# Patient Record
Sex: MALE | Race: WHITE | NOT HISPANIC OR LATINO | Employment: FULL TIME | URBAN - METROPOLITAN AREA
[De-identification: names, ages, dates, MRNs, and addresses within clinical notes are randomized per-mention and may not be internally consistent; named-entity substitution may affect disease eponyms.]

---

## 2021-10-18 ENCOUNTER — HOSPITAL ENCOUNTER (EMERGENCY)
Facility: CLINIC | Age: 31
Discharge: HOME | End: 2021-10-18
Attending: EMERGENCY MEDICINE | Admitting: EMERGENCY MEDICINE
Payer: COMMERCIAL

## 2021-10-18 ENCOUNTER — TELEPHONE (OUTPATIENT)
Dept: PEDIATRICS | Facility: CLINIC | Age: 31
End: 2021-10-18

## 2021-10-18 VITALS
RESPIRATION RATE: 18 BRPM | OXYGEN SATURATION: 99 % | HEART RATE: 73 BPM | TEMPERATURE: 97.6 F | SYSTOLIC BLOOD PRESSURE: 123 MMHG | DIASTOLIC BLOOD PRESSURE: 86 MMHG

## 2021-10-18 DIAGNOSIS — R07.89 ATYPICAL CHEST PAIN: ICD-10-CM

## 2021-10-18 DIAGNOSIS — R07.9 CHEST PAIN: Primary | ICD-10-CM

## 2021-10-18 DIAGNOSIS — F41.9 ANXIETY: ICD-10-CM

## 2021-10-18 DIAGNOSIS — R00.2 PALPITATIONS: ICD-10-CM

## 2021-10-18 LAB
ANION GAP SERPL CALCULATED.3IONS-SCNC: 7 MMOL/L (ref 3–14)
ATRIAL RATE - MUSE: 75 BPM
BASOPHILS # BLD AUTO: 0.1 10E3/UL (ref 0–0.2)
BASOPHILS NFR BLD AUTO: 1 %
BUN SERPL-MCNC: 13 MG/DL (ref 7–30)
CALCIUM SERPL-MCNC: 9 MG/DL (ref 8.5–10.1)
CHLORIDE BLD-SCNC: 104 MMOL/L (ref 94–109)
CO2 SERPL-SCNC: 28 MMOL/L (ref 20–32)
CREAT SERPL-MCNC: 1.02 MG/DL (ref 0.66–1.25)
DIASTOLIC BLOOD PRESSURE - MUSE: NORMAL MMHG
EOSINOPHIL # BLD AUTO: 0.2 10E3/UL (ref 0–0.7)
EOSINOPHIL NFR BLD AUTO: 2 %
ERYTHROCYTE [DISTWIDTH] IN BLOOD BY AUTOMATED COUNT: 11.6 % (ref 10–15)
GFR SERPL CREATININE-BSD FRML MDRD: >90 ML/MIN/1.73M2
GLUCOSE BLD-MCNC: 85 MG/DL (ref 70–99)
HCT VFR BLD AUTO: 48.3 % (ref 40–53)
HGB BLD-MCNC: 16.9 G/DL (ref 13.3–17.7)
HOLD SPECIMEN: NORMAL
IMM GRANULOCYTES # BLD: 0 10E3/UL
IMM GRANULOCYTES NFR BLD: 0 %
INTERPRETATION ECG - MUSE: NORMAL
LYMPHOCYTES # BLD AUTO: 2.2 10E3/UL (ref 0.8–5.3)
LYMPHOCYTES NFR BLD AUTO: 23 %
MCH RBC QN AUTO: 30.5 PG (ref 26.5–33)
MCHC RBC AUTO-ENTMCNC: 35 G/DL (ref 31.5–36.5)
MCV RBC AUTO: 87 FL (ref 78–100)
MONOCYTES # BLD AUTO: 1 10E3/UL (ref 0–1.3)
MONOCYTES NFR BLD AUTO: 11 %
NEUTROPHILS # BLD AUTO: 5.8 10E3/UL (ref 1.6–8.3)
NEUTROPHILS NFR BLD AUTO: 63 %
NRBC # BLD AUTO: 0 10E3/UL
NRBC BLD AUTO-RTO: 0 /100
P AXIS - MUSE: 39 DEGREES
PLATELET # BLD AUTO: 256 10E3/UL (ref 150–450)
POTASSIUM BLD-SCNC: 3.5 MMOL/L (ref 3.4–5.3)
PR INTERVAL - MUSE: 188 MS
QRS DURATION - MUSE: 100 MS
QT - MUSE: 414 MS
QTC - MUSE: 462 MS
R AXIS - MUSE: 31 DEGREES
RBC # BLD AUTO: 5.54 10E6/UL (ref 4.4–5.9)
SODIUM SERPL-SCNC: 139 MMOL/L (ref 133–144)
SYSTOLIC BLOOD PRESSURE - MUSE: NORMAL MMHG
T AXIS - MUSE: 8 DEGREES
TROPONIN I SERPL-MCNC: <0.015 UG/L (ref 0–0.04)
VENTRICULAR RATE- MUSE: 75 BPM
WBC # BLD AUTO: 9.2 10E3/UL (ref 4–11)

## 2021-10-18 PROCEDURE — 99284 EMERGENCY DEPT VISIT MOD MDM: CPT

## 2021-10-18 PROCEDURE — 93000 ELECTROCARDIOGRAM COMPLETE: CPT | Performed by: NURSE PRACTITIONER

## 2021-10-18 PROCEDURE — 36415 COLL VENOUS BLD VENIPUNCTURE: CPT | Performed by: EMERGENCY MEDICINE

## 2021-10-18 PROCEDURE — 84484 ASSAY OF TROPONIN QUANT: CPT | Performed by: EMERGENCY MEDICINE

## 2021-10-18 PROCEDURE — 85025 COMPLETE CBC W/AUTO DIFF WBC: CPT | Performed by: EMERGENCY MEDICINE

## 2021-10-18 PROCEDURE — 250N000013 HC RX MED GY IP 250 OP 250 PS 637: Performed by: EMERGENCY MEDICINE

## 2021-10-18 PROCEDURE — 80048 BASIC METABOLIC PNL TOTAL CA: CPT | Performed by: EMERGENCY MEDICINE

## 2021-10-18 PROCEDURE — 93005 ELECTROCARDIOGRAM TRACING: CPT

## 2021-10-18 RX ORDER — CLONAZEPAM 0.5 MG/1
1 TABLET ORAL ONCE
Status: COMPLETED | OUTPATIENT
Start: 2021-10-18 | End: 2021-10-18

## 2021-10-18 RX ORDER — CLONAZEPAM 1 MG/1
1 TABLET ORAL 2 TIMES DAILY PRN
Qty: 28 TABLET | Refills: 0 | Status: SHIPPED | OUTPATIENT
Start: 2021-10-18 | End: 2021-11-07

## 2021-10-18 RX ADMIN — CLONAZEPAM 1 MG: 0.5 TABLET ORAL at 09:01

## 2021-10-18 ASSESSMENT — ENCOUNTER SYMPTOMS
FEVER: 0
NERVOUS/ANXIOUS: 1
PALPITATIONS: 1
SORE THROAT: 0
COUGH: 0
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
## 2021-10-18 NOTE — TELEPHONE ENCOUNTER
Patient walked into the Urgent Care with complaint of Chest pain, heart flutter sensation, and shortness of breath for the past 2 days.     Chest pain, intermittent for the past 2 days.   Left sided chest pain.   Mild to moderate intensity.  Feels flutters in the chest.    Anxiety, chest pain and flutter sensation has been getting worse over the past 24 hours.   Shortness of breath for the past day and a half.     Anxiety has been worsening.  Issues with getting anxiety meds refilled due to appt needed and affordability.   Has not been able to sleep well due to symptoms.     Patient is currently experiencing Chest pain and Shortness of breath.     EKG taken.    Vitals:  98% O2.  78 pulse.   20 rr.  137/90 bp.  97.1F.    Father has Afib and heart failure.     Huddled with Eloisa ROCK.  Plan:  Patient needs to be evaluated in the Emergency Department.     Relayed above recommendation from Eloisa ROCK to the patient.     Patient verbalized agreement with plan, and will have a family member take him to the Emergency Department immediately.    Abdi Maguire RN on 10/18/2021 at 8:18 AM

## 2021-11-07 ENCOUNTER — OFFICE VISIT (OUTPATIENT)
Dept: FAMILY MEDICINE | Facility: CLINIC | Age: 31
End: 2021-11-07
Payer: COMMERCIAL

## 2021-11-07 VITALS
BODY MASS INDEX: 27.98 KG/M2 | HEIGHT: 75 IN | RESPIRATION RATE: 14 BRPM | DIASTOLIC BLOOD PRESSURE: 82 MMHG | HEART RATE: 84 BPM | WEIGHT: 225 LBS | OXYGEN SATURATION: 96 % | SYSTOLIC BLOOD PRESSURE: 130 MMHG

## 2021-11-07 DIAGNOSIS — R25.3 MUSCLE TWITCHING: Primary | ICD-10-CM

## 2021-11-07 PROCEDURE — 99203 OFFICE O/P NEW LOW 30 MIN: CPT

## 2021-11-07 RX ORDER — BUSPIRONE HYDROCHLORIDE 15 MG/1
15 TABLET ORAL 2 TIMES DAILY
COMMUNITY
Start: 2021-06-08

## 2021-11-07 RX ORDER — CLONAZEPAM 1 MG/1
1 TABLET ORAL
COMMUNITY
Start: 2021-11-05

## 2021-11-07 ASSESSMENT — MIFFLIN-ST. JEOR: SCORE: 2071.22

## 2021-11-07 NOTE — PATIENT INSTRUCTIONS
I believe this is just muscle twitching from the energy drinks you have been consuming.  Stop the energy drinks. I would rather see you drink 1 cup of coffee daily if you need some caffeine.   Post workout drink should be a glass of chocolate milk with the extra protein such as [employer] milk.   Do some stretches to the pectoral muscles to relax the muscle and you can also try heat to the area   See your primary care provider in 1-2 weeks if not helping.

## 2021-11-07 NOTE — PROGRESS NOTES
Assessment & Plan     Muscle twitching  He has been working daily for the past 50 days.  Using energy drinks on a daily basis to help him stay awake and function better.  He also uses an energy drink post workout.  Recommend he stop all energy drinks, 1 cup of coffee would be better to drink than all the additives that are and energy drinks.  Also recommended stopping the post workout energy drink and drinking a full glass of chocolate milk that has extra protein.  ER note and labs were reviewed.  I do not feel this is a cardiac event today.   He was not too concerned about his lightheadedness feels this is related to his fatigue.  I would agree.      20 minutes spent on the date of the encounter doing chart review, patient visit and documentation        Tobacco Cessation:   reports that he has been smoking. He has never used smokeless tobacco.    Recommend he stop vaping      See Patient Instructions    Return in about 1 week (around 11/14/2021), or if symptoms worsen or fail to improve.    [facility]    Daryn West is a 31 year old who presents for the following health issues     HPI     31-year-old male presents to clinic today for visible muscle twitching in the upper left chest.  Was seen in the emergency room October 18, 2021 for atypical chest pain and anxiety.  Chart reviewed along with labs.  EKG normal, troponin negative, basic metabolic panel and CBC all within normal limits.  He noticed twitching in the left upper chest muscle this morning when he got up.  Twitching comes and goes.  Is concerned it could be his heart.  Has a family history of heart disease.  His dad has a pacemaker and is going through some health issues.  He uses clonazepam for anxiety.  States he has been working for the past 50 days and is on his last couple days of his long stretch.  Has been using energy drinks on a daily basis to help  "him get going.  Denies using street drugs, occasional alcohol use.  Does vape but does not use cigarettes.  Feels a little lightheaded at times, however he feels this could be related to his long stretch of work and being overtired.    Review of Systems   CONSTITUTIONAL:POSITIVE  for  fatigue  RESP:NEGATIVE for significant cough or SOB  CV: NEGATIVE for chest pain, palpitations or peripheral edema  GI: NEGATIVE for nausea, abdominal pain, heartburn, or change in bowel habits  MUSCULOSKELETAL: positive for muscle twitching upper chest  PSYCHIATRIC: POSITIVE foranxiety      Objective    /82   Pulse 84   Resp 14   Ht 1.905 m (6' 3\")   Wt 102.1 kg (225 lb)   SpO2 96%   BMI 28.12 kg/m    Body mass index is 28.12 kg/m .  Physical Exam   GENERAL: healthy, alert and no distress  NECK: no adenopathy, no asymmetry, masses, or scars and thyroid normal to palpation  RESP: lungs clear to auscultation - no rales, rhonchi or wheezes  CV: regular rate and rhythm, normal S1 S2, no S3 or S4, no murmur, click or rub, no peripheral edema and peripheral pulses strong  MS: no gross musculoskeletal defects noted, no edema            "

## 2023-01-11 ENCOUNTER — ANCILLARY PROCEDURE (OUTPATIENT)
Dept: GENERAL RADIOLOGY | Facility: CLINIC | Age: 33
End: 2023-01-11
Attending: NURSE PRACTITIONER
Payer: COMMERCIAL

## 2023-01-11 ENCOUNTER — OFFICE VISIT (OUTPATIENT)
Dept: FAMILY MEDICINE | Facility: CLINIC | Age: 33
End: 2023-01-11
Payer: COMMERCIAL

## 2023-01-11 VITALS
RESPIRATION RATE: 18 BRPM | OXYGEN SATURATION: 98 % | SYSTOLIC BLOOD PRESSURE: 119 MMHG | DIASTOLIC BLOOD PRESSURE: 79 MMHG | TEMPERATURE: 97.8 F | BODY MASS INDEX: 28.87 KG/M2 | WEIGHT: 231 LBS | HEART RATE: 84 BPM

## 2023-01-11 DIAGNOSIS — M25.562 CHRONIC PAIN OF LEFT KNEE: ICD-10-CM

## 2023-01-11 DIAGNOSIS — K29.90 GASTRITIS AND DUODENITIS: ICD-10-CM

## 2023-01-11 DIAGNOSIS — G89.29 CHRONIC PAIN OF LEFT KNEE: ICD-10-CM

## 2023-01-11 DIAGNOSIS — R07.89 CHEST WALL PAIN: Primary | ICD-10-CM

## 2023-01-11 DIAGNOSIS — R07.89 CHEST WALL PAIN: ICD-10-CM

## 2023-01-11 PROBLEM — F17.201 TOBACCO USE DISORDER, MILD, IN EARLY REMISSION: Status: ACTIVE | Noted: 2017-08-31

## 2023-01-11 PROBLEM — K20.90 ESOPHAGITIS: Status: ACTIVE | Noted: 2020-01-23

## 2023-01-11 PROCEDURE — 71046 X-RAY EXAM CHEST 2 VIEWS: CPT | Mod: TC | Performed by: RADIOLOGY

## 2023-01-11 PROCEDURE — 93000 ELECTROCARDIOGRAM COMPLETE: CPT

## 2023-01-11 PROCEDURE — 73562 X-RAY EXAM OF KNEE 3: CPT | Mod: TC | Performed by: RADIOLOGY

## 2023-01-11 PROCEDURE — 99214 OFFICE O/P EST MOD 30 MIN: CPT

## 2023-01-11 RX ORDER — FAMOTIDINE 20 MG/1
20 TABLET, FILM COATED ORAL 2 TIMES DAILY PRN
Qty: 60 TABLET | Refills: 0 | Status: SHIPPED | OUTPATIENT
Start: 2023-01-11 | End: 2023-02-10

## 2023-01-11 ASSESSMENT — ENCOUNTER SYMPTOMS
JOINT SWELLING: 0
NERVOUS/ANXIOUS: 1
NEUROLOGICAL NEGATIVE: 1
GASTROINTESTINAL NEGATIVE: 1
ARTHRALGIAS: 1
CONSTITUTIONAL NEGATIVE: 1
PALPITATIONS: 0
RESPIRATORY NEGATIVE: 1

## 2023-01-11 NOTE — PATIENT INSTRUCTIONS
Stop vaping.  I sent in an Rx for famotidine to take twice a day as needed  Use a knee sleeve for support of the left knee. If it continues to be an issues, please see you provider for further evaluation.

## 2023-01-11 NOTE — PROGRESS NOTES
Assessment & Plan     Chest wall pain  No acute findings on CXR or EKG. Likely is a combination of costochondritis as well as his gastritis acting up.  Recommend he stop vaping.  He quit smoking cigarettes over 5 years ago.    We will place him on famotidine twice a day as needed.  Can use Tylenol or ice to the chest.  Would rather avoid NSAIDs since he has a history of gastritis and esophagitis.  Recommend follow-up with his primary care provider when he heads back to [place]    - EKG 12-lead complete w/read - Clinics  - XR Chest 2 Views; Future    Chronic pain of left knee  He has a knee sleeve at home that he will use for support.  Recommend follow-up with primary care provider if symptoms persist  - XR Knee Left 3 Views; Future    Gastritis and duodenitis  History of esophagitis and gastritis not currently on a PPI or H2 blocker.  We will start him on famotidine twice daily as needed.  Follow-up with primary care if symptoms persist    - famotidine (PEPCID) 20 MG tablet; Take 1 tablet (20 mg) by mouth 2 times daily as needed (reflux)      30 minutes spent on the date of the encounter doing chart review, interpretation of tests, patient visit and documentation        Nicotine/Tobacco Cessation:  He reports that he has been smoking vaping device. He has never used smokeless tobacco.  Nicotine/Tobacco Cessation Plan:   Stop vaping      See Patient Instructions    Return for Follow up with your provider in 1-2 weeks for recheck.    [facility]    Daryn West is a 32 year old presenting for the following health issues:  Urgent Care (Been having discomfort in his chest with no numbness in the arm, family hx of heart disease, plus he wants to be seen for left knee too, he injured years ago, and this year he was playing softball and re injured )      HPI     Presents with several months of left sided chest wall pain that  is sharp in nature that comes and goes.  Is not related to any movement, rest or eating.  Denies any cough, fever.  Does note sometimes he feels he cannot take a full breath.  Does not have any history of asthma or respiratory issues.  Does have a family history of heart disease.  His father had a stent placed 6 months ago.  He does have a history of anxiety, has been seen several times for chest wall pain and anxiety in the ER and also at this clinic by this writer.  He has a history of smoking cigarettes and now he vapes quite frequently.  Has hx of heroin abuse, anxiety and depression. Is on xanax PRN but states he only uses it once a month.   Hx gastritis and esophagitis not currently on an H2B or PPI. IS wondering if this is causing his discomfort.   States he feels this chest discomfort is different than when he had a panic attack and was having chest pain.    Also would like his left knee evaluated.  Injured it 5 years ago playing softball has had pain in the left knee since.  Injury was never evaluated when it happened.  Has been able to play softball since, but notices that it will ache and swell up after activity sometimes.  Has not used anything for his discomfort.    Review of Systems   Constitutional: Negative.    Respiratory: Negative.    Cardiovascular: Positive for chest pain. Negative for palpitations.   Gastrointestinal: Negative.    Musculoskeletal: Positive for arthralgias. Negative for joint swelling.   Skin: Negative.    Neurological: Negative.    Psychiatric/Behavioral: The patient is nervous/anxious.         Objective    /79   Pulse 84   Temp 97.8  F (36.6  C)   Resp 18   Wt 104.8 kg (231 lb)   SpO2 98%   BMI 28.87 kg/m    Body mass index is 28.87 kg/m .  Physical Exam  Vitals reviewed.   Constitutional:       General: He is not in acute distress.     Appearance: Normal appearance.   Cardiovascular:      Rate and Rhythm: Normal rate and regular rhythm.      Heart sounds: Normal  heart sounds. No murmur heard.  Pulmonary:      Effort: Pulmonary effort is normal. No respiratory distress.      Breath sounds: Normal breath sounds.   Musculoskeletal:         General: No swelling, tenderness, deformity or signs of injury. Normal range of motion.      Cervical back: Normal range of motion and neck supple.      Right lower leg: No edema.      Left lower leg: No edema.   Skin:     General: Skin is warm and dry.   Neurological:      General: No focal deficit present.      Mental Status: He is alert and oriented to person, place, and time.   Psychiatric:         Mood and Affect: Mood normal.            CXR - Reviewed and interpreted by me Normal- no infiltrates, effusions, pneumothoraces, cardiomegaly or masses  Xray - Reviewed and interpreted by me.  No acute findings, good joint spacing  EKG - Reviewed and interpreted by me appears normal, NSR, normal axis, normal intervals, no acute ST/T changes c/w ischemia, no LVH by voltage criteria, unchanged from previous tracings

## 2023-03-26 ENCOUNTER — HOSPITAL ENCOUNTER (EMERGENCY)
Facility: CLINIC | Age: 33
Discharge: HOME | End: 2023-03-26
Attending: STUDENT IN AN ORGANIZED HEALTH CARE EDUCATION/TRAINING PROGRAM | Admitting: STUDENT IN AN ORGANIZED HEALTH CARE EDUCATION/TRAINING PROGRAM
Payer: COMMERCIAL

## 2023-03-26 VITALS
WEIGHT: 225 LBS | RESPIRATION RATE: 16 BRPM | BODY MASS INDEX: 27.98 KG/M2 | HEART RATE: 101 BPM | DIASTOLIC BLOOD PRESSURE: 82 MMHG | SYSTOLIC BLOOD PRESSURE: 147 MMHG | TEMPERATURE: 98.4 F | HEIGHT: 75 IN | OXYGEN SATURATION: 98 %

## 2023-03-26 DIAGNOSIS — F43.21 GRIEF REACTION: ICD-10-CM

## 2023-03-26 LAB
AMPHETAMINES UR QL SCN: NORMAL
ANION GAP SERPL CALCULATED.3IONS-SCNC: 13 MMOL/L (ref 7–15)
APAP SERPL-MCNC: <5 UG/ML (ref 10–30)
BARBITURATES UR QL SCN: NORMAL
BASOPHILS # BLD AUTO: 0.1 10E3/UL (ref 0–0.2)
BASOPHILS NFR BLD AUTO: 1 %
BENZODIAZ UR QL SCN: NORMAL
BUN SERPL-MCNC: 10.3 MG/DL (ref 6–20)
BZE UR QL SCN: NORMAL
CALCIUM SERPL-MCNC: 9.7 MG/DL (ref 8.6–10)
CANNABINOIDS UR QL SCN: NORMAL
CHLORIDE SERPL-SCNC: 100 MMOL/L (ref 98–107)
CREAT SERPL-MCNC: 0.89 MG/DL (ref 0.67–1.17)
DEPRECATED HCO3 PLAS-SCNC: 24 MMOL/L (ref 22–29)
EOSINOPHIL # BLD AUTO: 0 10E3/UL (ref 0–0.7)
EOSINOPHIL NFR BLD AUTO: 0 %
ERYTHROCYTE [DISTWIDTH] IN BLOOD BY AUTOMATED COUNT: 11.9 % (ref 10–15)
GFR SERPL CREATININE-BSD FRML MDRD: >90 ML/MIN/1.73M2
GLUCOSE SERPL-MCNC: 94 MG/DL (ref 70–99)
HCT VFR BLD AUTO: 47.3 % (ref 40–53)
HGB BLD-MCNC: 16.9 G/DL (ref 13.3–17.7)
HOLD SPECIMEN: NORMAL
IMM GRANULOCYTES # BLD: 0.1 10E3/UL
IMM GRANULOCYTES NFR BLD: 0 %
LYMPHOCYTES # BLD AUTO: 1.6 10E3/UL (ref 0.8–5.3)
LYMPHOCYTES NFR BLD AUTO: 13 %
MCH RBC QN AUTO: 29.9 PG (ref 26.5–33)
MCHC RBC AUTO-ENTMCNC: 35.7 G/DL (ref 31.5–36.5)
MCV RBC AUTO: 84 FL (ref 78–100)
MONOCYTES # BLD AUTO: 1.1 10E3/UL (ref 0–1.3)
MONOCYTES NFR BLD AUTO: 9 %
NEUTROPHILS # BLD AUTO: 9.6 10E3/UL (ref 1.6–8.3)
NEUTROPHILS NFR BLD AUTO: 77 %
NRBC # BLD AUTO: 0 10E3/UL
NRBC BLD AUTO-RTO: 0 /100
OPIATES UR QL SCN: NORMAL
PCP QUAL URINE (ROCHE): NORMAL
PLATELET # BLD AUTO: 234 10E3/UL (ref 150–450)
POTASSIUM SERPL-SCNC: 3.4 MMOL/L (ref 3.4–5.3)
RBC # BLD AUTO: 5.66 10E6/UL (ref 4.4–5.9)
SALICYLATES SERPL-MCNC: <0.3 MG/DL
SODIUM SERPL-SCNC: 137 MMOL/L (ref 136–145)
WBC # BLD AUTO: 12.4 10E3/UL (ref 4–11)

## 2023-03-26 PROCEDURE — 80307 DRUG TEST PRSMV CHEM ANLYZR: CPT | Performed by: STUDENT IN AN ORGANIZED HEALTH CARE EDUCATION/TRAINING PROGRAM

## 2023-03-26 PROCEDURE — 85018 HEMOGLOBIN: CPT | Performed by: STUDENT IN AN ORGANIZED HEALTH CARE EDUCATION/TRAINING PROGRAM

## 2023-03-26 PROCEDURE — 80048 BASIC METABOLIC PNL TOTAL CA: CPT | Performed by: STUDENT IN AN ORGANIZED HEALTH CARE EDUCATION/TRAINING PROGRAM

## 2023-03-26 PROCEDURE — 80143 DRUG ASSAY ACETAMINOPHEN: CPT | Performed by: STUDENT IN AN ORGANIZED HEALTH CARE EDUCATION/TRAINING PROGRAM

## 2023-03-26 PROCEDURE — 80179 DRUG ASSAY SALICYLATE: CPT | Performed by: STUDENT IN AN ORGANIZED HEALTH CARE EDUCATION/TRAINING PROGRAM

## 2023-03-26 PROCEDURE — 36415 COLL VENOUS BLD VENIPUNCTURE: CPT | Performed by: STUDENT IN AN ORGANIZED HEALTH CARE EDUCATION/TRAINING PROGRAM

## 2023-03-26 PROCEDURE — 99283 EMERGENCY DEPT VISIT LOW MDM: CPT

## 2023-03-26 ASSESSMENT — ENCOUNTER SYMPTOMS
NERVOUS/ANXIOUS: 1
SHORTNESS OF BREATH: 0
HALLUCINATIONS: 0
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
## 2023-05-30 ENCOUNTER — OFFICE VISIT (OUTPATIENT)
Dept: FAMILY MEDICINE | Facility: CLINIC | Age: 33
End: 2023-05-30
Payer: COMMERCIAL

## 2023-05-30 VITALS
SYSTOLIC BLOOD PRESSURE: 144 MMHG | OXYGEN SATURATION: 97 % | TEMPERATURE: 98.5 F | HEART RATE: 74 BPM | RESPIRATION RATE: 16 BRPM | DIASTOLIC BLOOD PRESSURE: 76 MMHG

## 2023-05-30 DIAGNOSIS — L08.9: Primary | ICD-10-CM

## 2023-05-30 DIAGNOSIS — M26.629 TMJ PAIN DYSFUNCTION SYNDROME: ICD-10-CM

## 2023-05-30 DIAGNOSIS — T20.20XA: Primary | ICD-10-CM

## 2023-05-30 PROCEDURE — 99213 OFFICE O/P EST LOW 20 MIN: CPT | Mod: 25

## 2023-05-30 PROCEDURE — 96372 THER/PROPH/DIAG INJ SC/IM: CPT | Performed by: PHYSICIAN ASSISTANT

## 2023-05-30 RX ORDER — KETOROLAC TROMETHAMINE 30 MG/ML
30 INJECTION, SOLUTION INTRAMUSCULAR; INTRAVENOUS ONCE
Status: COMPLETED | OUTPATIENT
Start: 2023-05-30 | End: 2023-05-30

## 2023-05-30 RX ORDER — SULFAMETHOXAZOLE/TRIMETHOPRIM 800-160 MG
1 TABLET ORAL 2 TIMES DAILY
Qty: 14 TABLET | Refills: 0 | Status: SHIPPED | OUTPATIENT
Start: 2023-05-30 | End: 2023-06-06

## 2023-05-30 RX ADMIN — KETOROLAC TROMETHAMINE 30 MG: 30 INJECTION, SOLUTION INTRAMUSCULAR; INTRAVENOUS at 13:50

## 2023-05-30 ASSESSMENT — ENCOUNTER SYMPTOMS
WOUND: 1
CARDIOVASCULAR NEGATIVE: 1
RESPIRATORY NEGATIVE: 1
NEUROLOGICAL NEGATIVE: 1
CONSTITUTIONAL NEGATIVE: 1

## 2023-05-30 NOTE — PROGRESS NOTES
Assessment & Plan       ICD-10-CM    1. Infected friction burn of face, second degree, initial encounter  T20.20XA sulfamethoxazole-trimethoprim (BACTRIM DS) 800-160 MG tablet    L08.9       2. TMJ pain dysfunction syndrome  M26.629 ketorolac (TORADOL) injection 30 mg           Patient instructions:  To take antibiotic as directed, keep area clean and dry, wear mouthguard at night. He is to follow up with dermatology for his lip if this is recurrent, may need annual skin check. He will follow up with his dentist for his jaw pain.     Medical decision making:  Unclear if TMJ pain is related, does have history of TMJ disorder. I have concern for lip burn, will treat with oral antibiotics. He is to monitor the area closely. Given IM Toradol 30 mg in clinic.     Return if symptoms worsen or fail to improve, for Follow up.    At the end of the encounter, I discussed results, diagnosis, medications. Discussed red flags for immediate return to clinic/ER, as well as indications for follow up if no improvement. Patient understood and agreed to plan. Patient was stable for discharge.    Subjective     Brett is a 32 year old male who presents to clinic today for the following health issues:  Chief Complaint   Patient presents with     Pain     Sunburn on lower lip and now blister looking, started 4 days, swollen, TMJ, Rt side pain of neck to head with throbbing pan x 48 hrs, no dizziness or unbalance     Brett presents with reports of history of sunburn on his lower lip 4 days ago that is now blistering as well as jaw pain. He reports his lip began as a sunburn, then got very swollen and blistered. The swelling has gone down today but he also noted jaw pain that radiates to his ear and eye. He has tried Ibuprofen which offers minimal relief. He states he has had this issue with his lip previously. His jaw he has history of TMJ pain and wears his retainer at night.           Review of Systems   Constitutional: Negative.     HENT: Positive for dental problem.    Respiratory: Negative.    Cardiovascular: Negative.    Skin: Positive for wound.   Neurological: Negative.        Problem List:  2020-01: Esophagitis  2020-01: Gastritis and duodenitis  2017-08: Tobacco use disorder, mild, in early remission  2016-09: History of narcotic addiction (H)  2016-04: Mixed anxiety depressive disorder      No past medical history on file.    Social History     Tobacco Use     Smoking status: Every Day     Types: Vaping Device     Smokeless tobacco: Never     Tobacco comments:     Quit smoking cigarettes. Carries a 7 pack year history. Smoked a pack a day for  years   Vaping Use     Vaping status: Not on file   Substance Use Topics     Alcohol use: Not Currently     Comment: 1-2 tiems a month           Objective    BP (!) 144/76   Pulse 74   Temp 98.5  F (36.9  C) (Oral)   Resp 16   SpO2 97%   Physical Exam  Constitutional:       Appearance: Normal appearance.   HENT:      Head: Normocephalic and atraumatic.      Jaw: Tenderness and pain on movement present. No swelling or malocclusion.      Right Ear: Tympanic membrane, ear canal and external ear normal.      Left Ear: Tympanic membrane, ear canal and external ear normal.      Mouth/Throat:      Lips: Lesions present.      Mouth: Mucous membranes are moist.   Musculoskeletal:         General: Normal range of motion.      Cervical back: Normal range of motion and neck supple.   Skin:     General: Skin is warm and dry.   Neurological:      General: No focal deficit present.      Mental Status: He is alert and oriented to person, place, and time.   Psychiatric:         Mood and Affect: Mood normal.         Behavior: Behavior normal.         Thought Content: Thought content normal.         Judgment: Judgment normal.              Kamran Ortiz PA-C

## 2023-06-04 ENCOUNTER — HEALTH MAINTENANCE LETTER (OUTPATIENT)
Age: 33
End: 2023-06-04

## 2023-12-22 ENCOUNTER — OFFICE VISIT (OUTPATIENT)
Dept: FAMILY MEDICINE | Facility: CLINIC | Age: 33
End: 2023-12-22
Payer: COMMERCIAL

## 2023-12-22 ENCOUNTER — ANCILLARY PROCEDURE (OUTPATIENT)
Dept: GENERAL RADIOLOGY | Facility: CLINIC | Age: 33
End: 2023-12-22
Attending: FAMILY MEDICINE
Payer: COMMERCIAL

## 2023-12-22 VITALS
TEMPERATURE: 97.7 F | HEART RATE: 86 BPM | SYSTOLIC BLOOD PRESSURE: 118 MMHG | DIASTOLIC BLOOD PRESSURE: 84 MMHG | OXYGEN SATURATION: 98 %

## 2023-12-22 DIAGNOSIS — R22.2 MASS OF CHEST WALL: ICD-10-CM

## 2023-12-22 DIAGNOSIS — R22.2 MASS OF CHEST WALL: Primary | ICD-10-CM

## 2023-12-22 LAB
BASOPHILS # BLD AUTO: 0 10E3/UL (ref 0–0.2)
BASOPHILS NFR BLD AUTO: 1 %
EOSINOPHIL # BLD AUTO: 0 10E3/UL (ref 0–0.7)
EOSINOPHIL NFR BLD AUTO: 1 %
ERYTHROCYTE [DISTWIDTH] IN BLOOD BY AUTOMATED COUNT: 11.8 % (ref 10–15)
HCT VFR BLD AUTO: 45.9 % (ref 40–53)
HGB BLD-MCNC: 16.3 G/DL (ref 13.3–17.7)
IMM GRANULOCYTES # BLD: 0 10E3/UL
IMM GRANULOCYTES NFR BLD: 0 %
LYMPHOCYTES # BLD AUTO: 1.8 10E3/UL (ref 0.8–5.3)
LYMPHOCYTES NFR BLD AUTO: 20 %
MCH RBC QN AUTO: 29.9 PG (ref 26.5–33)
MCHC RBC AUTO-ENTMCNC: 35.5 G/DL (ref 31.5–36.5)
MCV RBC AUTO: 84 FL (ref 78–100)
MONOCYTES # BLD AUTO: 0.8 10E3/UL (ref 0–1.3)
MONOCYTES NFR BLD AUTO: 9 %
NEUTROPHILS # BLD AUTO: 6.1 10E3/UL (ref 1.6–8.3)
NEUTROPHILS NFR BLD AUTO: 70 %
PLATELET # BLD AUTO: 204 10E3/UL (ref 150–450)
RBC # BLD AUTO: 5.45 10E6/UL (ref 4.4–5.9)
WBC # BLD AUTO: 8.8 10E3/UL (ref 4–11)

## 2023-12-22 PROCEDURE — 36415 COLL VENOUS BLD VENIPUNCTURE: CPT

## 2023-12-22 PROCEDURE — 99213 OFFICE O/P EST LOW 20 MIN: CPT

## 2023-12-22 PROCEDURE — 71046 X-RAY EXAM CHEST 2 VIEWS: CPT | Mod: TC | Performed by: RADIOLOGY

## 2023-12-22 PROCEDURE — 85025 COMPLETE CBC W/AUTO DIFF WBC: CPT

## 2023-12-22 NOTE — PROGRESS NOTES
URGENT CARE    ASSESSMENT AND PLAN:      ICD-10-CM    1. Mass of chest wall  R22.2 CBC with platelets differential     XR Chest 2 Views     CBC with platelets differential          Differential Diagnosis include but not limited to lipoma, abscess, malignancy, etc.  CXR read by radiologist and CBC are reassuring today.  I have placed a follow-up PCP referral for further evaluation for rule out of palpable mass.  Supportive and OTC measures discussed with patient and available and after visit summary.      Reviewed alarm signs and symptoms needing urgent evaluation.     Patient verbalized understanding and is agreeable to plan. The patient was discharged ambulatory and in stable condition.        Subjective     Brett Manriquez is a 33 year old who presents for evaluation of  palpable mass noted for the past few months but feels that the area has grown in size and occasionally at times will have a hard time taking a deep breath.  He denies chest pain, fever/chills, unintentional weight loss, purulent drainage, or tenderness upon palpation.    OTC: none    Review of Systems  All systems reviewed and negative except per HPI.        Objective    /84   Pulse 86   Temp 97.7  F (36.5  C) (Tympanic)   SpO2 98%     Physical Exam   GENERAL: healthy, alert and no distress  NECK: no adenopathy, no asymmetry, masses, or scars and thyroid normal to palpation  RESP: lungs clear to auscultation - no rales, rhonchi or wheezes  CV: regular rate and rhythm, normal S1 S2, no S3 or S4, no murmur, click or rub, no peripheral edema and peripheral pulses strong  SKIN: Left upper chest wall with palpable mobile mass that is soft.  Slight erythema but no pain upon palpation or tenderness.  No drainage.            XR CHEST 2 VIEWS  12/22/2023 1:56 PM       INDICATION: palpable marble size mass on left upper chest wall. R/O  other mass in cavity or acute etiology.  COMPARISON: 1/11/2023                                                                        IMPRESSION: Negative chest.      Results for orders placed or performed in visit on 12/22/23 (from the past 24 hour(s))   CBC with platelets differential    Narrative    The following orders were created for panel order CBC with platelets differential.  Procedure                               Abnormality         Status                     ---------                               -----------         ------                     CBC with platelets and d...[[id number]]                      Final result                 Please view results for these tests on the individual orders.   CBC with platelets and differential   Result Value Ref Range    WBC Count 8.8 4.0 - 11.0 10e3/uL    RBC Count 5.45 4.40 - 5.90 10e6/uL    Hemoglobin 16.3 13.3 - 17.7 g/dL    Hematocrit 45.9 40.0 - 53.0 %    MCV 84 78 - 100 fL    MCH 29.9 26.5 - 33.0 pg    MCHC 35.5 31.5 - 36.5 g/dL    RDW 11.8 10.0 - 15.0 %    Platelet Count 204 150 - 450 10e3/uL    % Neutrophils 70 %    % Lymphocytes 20 %    % Monocytes 9 %    % Eosinophils 1 %    % Basophils 1 %    % Immature Granulocytes 0 %    Absolute Neutrophils 6.1 1.6 - 8.3 10e3/uL    Absolute Lymphocytes 1.8 0.8 - 5.3 10e3/uL    Absolute Monocytes 0.8 0.0 - 1.3 10e3/uL    Absolute Eosinophils 0.0 0.0 - 0.7 10e3/uL    Absolute Basophils 0.0 0.0 - 0.2 10e3/uL    Absolute Immature Granulocytes 0.0 <=0.4 10e3/uL

## 2023-12-22 NOTE — PATIENT INSTRUCTIONS
Will notify you with CBC and CXR results via mychart or phone call. Most likely will need followup with PCP for further imaging.     OTC and supportive measures:  Use ice to aid with discomfort if indicated  Tylenol/ibuprofen as needed    Please make an appointment to follow-up with primary care provider for further evaluation

## 2024-01-03 ENCOUNTER — OFFICE VISIT (OUTPATIENT)
Dept: FAMILY MEDICINE | Facility: CLINIC | Age: 34
End: 2024-01-03
Payer: COMMERCIAL

## 2024-01-03 VITALS
DIASTOLIC BLOOD PRESSURE: 64 MMHG | SYSTOLIC BLOOD PRESSURE: 102 MMHG | HEART RATE: 71 BPM | OXYGEN SATURATION: 98 % | TEMPERATURE: 96.9 F

## 2024-01-03 DIAGNOSIS — F41.8 MIXED ANXIETY DEPRESSIVE DISORDER: ICD-10-CM

## 2024-01-03 DIAGNOSIS — R07.89 CHEST WALL PAIN: Primary | ICD-10-CM

## 2024-01-03 PROCEDURE — 99213 OFFICE O/P EST LOW 20 MIN: CPT | Mod: 25

## 2024-01-03 PROCEDURE — 93000 ELECTROCARDIOGRAM COMPLETE: CPT

## 2024-01-03 NOTE — PROGRESS NOTES
Assessment & Plan     Chest wall pain  Recommend follow-up as planned in 1 month.  Reassurance offered this appears to be a musculoskeletal issue.  Reviewed labs and x-rays with him.  EKG today was negative.  Recommend using heat to the area and ibuprofen to see if this helps alleviate his symptoms.  Recommend stop vaping.    - EKG 12-lead complete w/read - Clinics    Mixed anxiety depressive disorder    - EKG 12-lead complete w/read - Clinics         Nicotine/Tobacco Cessation:  He reports that he has been smoking vaping device. He has never used smokeless tobacco.  Nicotine/Tobacco Cessation Plan:         No follow-ups on file.    [facility]    Daryn West is a 33 year old, presenting for the following health issues:  Urgent Care (Pt was seen recently and is concern about the lump on his upper left side of his chest. Blood work and xray was done and it all came back neg. Pt is worried and having trouble breathing)      HPI     Was seen December 22, 2023 for new mass in the left chest wall.  Labs and chest x-ray were negative.  Has a follow-up appointment in early February with primary care.  Presents today because he is concerned this could be his heart.  He does have a history of anxiety which is currently untreated.  Also a history of narcotic addiction which she denies he is using at this time.  Had a friend recently die of a heart condition that was undiagnosed.  Denies any chest pain or shortness of breath.  Has not tried anything for his symptoms.          Review of Systems   Constitutional, HEENT, cardiovascular, pulmonary, gi and gu systems are negative, except as otherwise noted.      Objective    /64   Pulse 71   Temp 96.9  F (36.1  C) (Tympanic)   SpO2 98%   There is no height or weight on file to calculate BMI.  Physical Exam   GENERAL: healthy, alert and no distress  EYES: Eyes grossly normal to  inspection, PERRL and conjunctivae and sclerae normal  NECK: no adenopathy, no asymmetry, masses, or scars and thyroid normal to palpation  RESP: lungs clear to auscultation - no rales, rhonchi or wheezes  CV: regular rates and rhythm, normal S1 S2, no S3 or S4, and no murmur, click or rub.  SKIN: Mildly raised area left upper chest just adjacent to the sternal border that measures approximately 2 cm in diameter.  Some pain with palpation.    EKG - Reviewed and interpreted by me appears normal, NSR, normal axis, normal intervals, no acute ST/T changes c/w ischemia, no LVH by voltage criteria, unchanged from previous tracings

## 2024-02-09 PROBLEM — M26.629 CHRONIC TMJ PAIN: Status: ACTIVE | Noted: 2020-01-02

## 2024-02-09 PROBLEM — F13.10 BENZODIAZEPINE ABUSE (H): Status: ACTIVE | Noted: 2019-09-12

## 2024-02-09 PROBLEM — I49.49 PREMATURE BEATS: Status: ACTIVE | Noted: 2020-02-13

## 2024-02-09 PROBLEM — Z02.83 ENCOUNTER FOR DRUG SCREENING: Status: ACTIVE | Noted: 2019-11-07

## 2024-02-09 PROBLEM — G89.29 CHRONIC TMJ PAIN: Status: ACTIVE | Noted: 2020-01-02

## 2024-07-27 ENCOUNTER — HEALTH MAINTENANCE LETTER (OUTPATIENT)
Age: 34
End: 2024-07-27

## 2024-08-09 ENCOUNTER — ANCILLARY PROCEDURE (OUTPATIENT)
Dept: GENERAL RADIOLOGY | Facility: CLINIC | Age: 34
End: 2024-08-09
Attending: NURSE PRACTITIONER
Payer: COMMERCIAL

## 2024-08-09 ENCOUNTER — OFFICE VISIT (OUTPATIENT)
Dept: FAMILY MEDICINE | Facility: CLINIC | Age: 34
End: 2024-08-09
Payer: COMMERCIAL

## 2024-08-09 VITALS
OXYGEN SATURATION: 97 % | HEART RATE: 79 BPM | DIASTOLIC BLOOD PRESSURE: 82 MMHG | SYSTOLIC BLOOD PRESSURE: 133 MMHG | TEMPERATURE: 97.1 F

## 2024-08-09 DIAGNOSIS — M79.672 LEFT FOOT PAIN: Primary | ICD-10-CM

## 2024-08-09 DIAGNOSIS — S90.32XA CONTUSION OF LEFT FOOT, INITIAL ENCOUNTER: ICD-10-CM

## 2024-08-09 DIAGNOSIS — M79.672 LEFT FOOT PAIN: ICD-10-CM

## 2024-08-09 PROCEDURE — 73630 X-RAY EXAM OF FOOT: CPT | Mod: LT | Performed by: STUDENT IN AN ORGANIZED HEALTH CARE EDUCATION/TRAINING PROGRAM

## 2024-08-09 PROCEDURE — 99213 OFFICE O/P EST LOW 20 MIN: CPT

## 2024-08-09 NOTE — PROGRESS NOTES
Assessment & Plan     Left foot pain    - XR Foot Left G/E 3 Views; Future    Contusion of left foot, initial encounter  X-ray negative for any fracture or dislocation.  Was educated on how to treat contusions of the foot.  Recommend ice, elevation hard soled shoe and rest.  May use ibuprofen for pain and swelling as needed.  Was given an Ace wrap to use for support          Return in about 1 week (around 8/16/2024), or if symptoms worsen or fail to improve.    Subjective   Brett is a 34 year old, presenting for the following health issues:  Urgent Care (Left foot pain - hit in foot with sotball)    HPI   Brett was playing softball last night when he was hit in the left foot by a fly ball. Was able to continue with play but this morning has had increased difficulty walking due to pain and swelling along the left medial foot. No past hx of fractures in the left foot.         Review of Systems  CONSTITUTIONAL: NEGATIVE for fever, chills, change in weight  MUSCULOSKELETAL: injury to left foot      Objective    /82   Pulse 79   Temp 97.1  F (36.2  C) (Tympanic)   SpO2 97%   There is no height or weight on file to calculate BMI.  Physical Exam   GENERAL: alert and no distress  MS: bruising noted to medial left foot along the first metatarsal with mild swelling. Pain with palpation long the first metatarsal. Able to wiggle all toes, brisk cap refill and sensation intact. Strong DP and PT pulses.           Results for orders placed or performed in visit on 08/09/24   XR Foot Left G/E 3 Views     Status: None    Narrative    XR FOOT LEFT G/E 3 VIEWS 8/9/2024 3:19 PM     HISTORY: softball hit left foot last night. Pain along left first  metatarsal. r/o fracture.; Left foot pain    COMPARISON: None.       Impression    IMPRESSION:  No fracture or malalignment. Joint spaces are maintained. Os peroneum.    Sae Montes MD         SYSTEM ID:  [id number]   '    Signed Electronically by: [facility]

## 2025-04-11 PROBLEM — Z72.89 CURRENT EVERY DAY VAPING: Status: ACTIVE | Noted: 2025-04-11

## 2025-04-11 PROBLEM — F13.20 BENZODIAZEPINE DEPENDENCE, CONTINUOUS (H): Status: ACTIVE | Noted: 2019-09-12

## 2025-04-11 PROBLEM — Z72.0 TOBACCO USE: Status: ACTIVE | Noted: 2025-04-11

## 2025-04-18 ENCOUNTER — HOSPITAL ENCOUNTER (OUTPATIENT)
Dept: CT IMAGING | Facility: CLINIC | Age: 35
Discharge: HOME | End: 2025-04-18
Attending: INTERNAL MEDICINE | Admitting: INTERNAL MEDICINE
Payer: COMMERCIAL

## 2025-04-18 DIAGNOSIS — R22.2 MASS OF CHEST WALL: ICD-10-CM

## 2025-04-18 PROCEDURE — 71260 CT THORAX DX C+: CPT

## 2025-04-18 PROCEDURE — 250N000011 HC RX IP 250 OP 636: Performed by: INTERNAL MEDICINE

## 2025-04-18 RX ORDER — IOPAMIDOL 755 MG/ML
75 INJECTION, SOLUTION INTRAVASCULAR ONCE
Status: COMPLETED | OUTPATIENT
Start: 2025-04-18 | End: 2025-04-18

## 2025-04-18 RX ADMIN — IOPAMIDOL 75 ML: 755 INJECTION, SOLUTION INTRAVENOUS at 11:04

## 2025-04-21 ENCOUNTER — TELEPHONE (OUTPATIENT)
Dept: INTERNAL MEDICINE | Facility: CLINIC | Age: 35
End: 2025-04-21
Payer: COMMERCIAL

## 2025-04-21 NOTE — TELEPHONE ENCOUNTER
Test Results    Contacts       Contact Date/Time Type Contact Phone/Fax    04/21/2025 01:21 PM CDT Phone (Incoming) Brett Manriquez (Self) [phone] (H)     results            Who ordered the test:  Dr. Wolff    Type of test: CT    Date of test:  4.18.25    Where was the test performed:  [facility]    What are your questions/concerns?:  Patient requesting interpretation of recent CT scan. Please advise at the number provided.    Could we send this information to you in [employer] or would you prefer to receive a phone call?:   Patient would prefer a phone call   Okay to leave a detailed message?: Yes at Home number on file [phone] (home)

## 2025-08-10 ENCOUNTER — HEALTH MAINTENANCE LETTER (OUTPATIENT)
Age: 35
End: 2025-08-10